# Patient Record
(demographics unavailable — no encounter records)

---

## 2024-12-19 NOTE — HISTORY OF PRESENT ILLNESS
[de-identified] : 12/19/24: 30 y/o transgender person presents to evaluate voice. Her goals are to have a more feminine voice. No issues eating, chewing, or swallowing. No breathing issues, cough, or regurgitation of food. 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 She has not started hormone therapy as of yet, she is planning to start in 1 month. She started transitioning socially 4 months ago.

## 2024-12-19 NOTE — PROCEDURE
[de-identified] : -   Pre-operative Diagnosis: Dysphonia Post-operative Diagnosis: mild laryngopharyngeal reflux Anesthesia: Topical - 1 % Lidocaine/Phenylephrine Procedure: Flexible Laryngoscopy with Stroboscopy - CPT 24911   Procedure Details: The patient was placed in the sitting position. After decongestant and anesthesia were applied the laryngoscope was passed. The nasal cavities, nasopharynx, oropharynx, hypopharynx, and larynx were all examined. Vocal folds were examined during respiration and phonation. The following findings were noted:   Findings: Nose: Septum is midline, turbinates are normal, nasal airways patent, mucosa normal Nasopharynx: Adenoids normal, no masses, eustachian tube normal Oropharynx: Pharyngeal walls symmetric and without lesion. Tonsils/fossae symmetric Hypopharynx: Hypopharynx and pyriform sinuses without lesion. No masses or asymmetry. Larynx: Epiglottis and aryepiglottic folds were sharp and crisp bilaterally. Bilateral false vocal folds normal appearance. Airway was widely patent. + postcricoid edema, erythema of the vocal process   Strobe Exam Ratings   TVF Appearance: normal, mild erythema of the vocal process TVF Mobility: normal Edema/hypertrophy: normal, + postcricoid edema Mucus on TVF: normal Glottic Closure: normal/adequate Mucosal Wave: normal Amplitude of Vibration: normal Phase: symmetric Supraglottic Hyperfunction: none Other Findings: none   Condition: Stable. Patient tolerated procedure well.   Complications: None

## 2024-12-19 NOTE — ASSESSMENT
[FreeTextEntry1] : - 12/19/24: 30 y/o transgender person presents to evaluate voice. Her goals are to have a more feminine voice. On stroboscopy she was found to have evidence of mild LPR, currently asymptomatic at this time. I am recommending voice hygiene, increased hydration, consultation with speech language pathology and speech therapy. She is not planning for any other surgical interventions at this time. Follow up after evaluation with speech.   -Voice hygiene, increased hydration -Consultation with speech language pathology and speech therapy  -Follow up after speech eval

## 2024-12-19 NOTE — HISTORY OF PRESENT ILLNESS
[de-identified] : 12/19/24: 30 y/o transgender person presents to evaluate voice. Her goals are to have a more feminine voice. No issues eating, chewing, or swallowing. No breathing issues, cough, or regurgitation of food. 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 She has not started hormone therapy as of yet, she is planning to start in 1 month. She started transitioning socially 4 months ago.

## 2024-12-19 NOTE — PROCEDURE
[de-identified] : -   Pre-operative Diagnosis: Dysphonia Post-operative Diagnosis: mild laryngopharyngeal reflux Anesthesia: Topical - 1 % Lidocaine/Phenylephrine Procedure: Flexible Laryngoscopy with Stroboscopy - CPT 34317   Procedure Details: The patient was placed in the sitting position. After decongestant and anesthesia were applied the laryngoscope was passed. The nasal cavities, nasopharynx, oropharynx, hypopharynx, and larynx were all examined. Vocal folds were examined during respiration and phonation. The following findings were noted:   Findings: Nose: Septum is midline, turbinates are normal, nasal airways patent, mucosa normal Nasopharynx: Adenoids normal, no masses, eustachian tube normal Oropharynx: Pharyngeal walls symmetric and without lesion. Tonsils/fossae symmetric Hypopharynx: Hypopharynx and pyriform sinuses without lesion. No masses or asymmetry. Larynx: Epiglottis and aryepiglottic folds were sharp and crisp bilaterally. Bilateral false vocal folds normal appearance. Airway was widely patent. + postcricoid edema, erythema of the vocal process   Strobe Exam Ratings   TVF Appearance: normal, mild erythema of the vocal process TVF Mobility: normal Edema/hypertrophy: normal, + postcricoid edema Mucus on TVF: normal Glottic Closure: normal/adequate Mucosal Wave: normal Amplitude of Vibration: normal Phase: symmetric Supraglottic Hyperfunction: none Other Findings: none   Condition: Stable. Patient tolerated procedure well.   Complications: None